# Patient Record
Sex: MALE | Race: BLACK OR AFRICAN AMERICAN | NOT HISPANIC OR LATINO | URBAN - METROPOLITAN AREA
[De-identification: names, ages, dates, MRNs, and addresses within clinical notes are randomized per-mention and may not be internally consistent; named-entity substitution may affect disease eponyms.]

---

## 2019-04-29 ENCOUNTER — EMERGENCY (EMERGENCY)
Facility: HOSPITAL | Age: 34
LOS: 1 days | Discharge: HOME | End: 2019-04-29
Attending: EMERGENCY MEDICINE
Payer: SELF-PAY

## 2019-04-29 VITALS
HEIGHT: 71 IN | HEART RATE: 83 BPM | WEIGHT: 184.97 LBS | SYSTOLIC BLOOD PRESSURE: 131 MMHG | DIASTOLIC BLOOD PRESSURE: 80 MMHG | TEMPERATURE: 98 F | OXYGEN SATURATION: 96 % | RESPIRATION RATE: 16 BRPM

## 2019-04-29 PROCEDURE — 99283 EMERGENCY DEPT VISIT LOW MDM: CPT

## 2019-04-29 NOTE — ED PROVIDER NOTE - PROGRESS NOTE DETAILS
Attending MD Reno: Dr. Chris paged (on for hand) Topher Schmitt DO: received from Dr Reno pending Hand eval. pt repaired/splinted by Hand will f/u in their office. Will dc with shortly course of abx and pain meds prn. Return precautions were discussed with patient at bedside and patient expressed understanding. pt ambulating and stable for dc
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
## 2019-04-29 NOTE — ED PROVIDER NOTE - CARE PLAN
Principal Discharge DX:	Hand injury Principal Discharge DX:	Hand laceration involving tendon, initial encounter

## 2019-04-29 NOTE — ED PROVIDER NOTE - CARE PROVIDER_API CALL
Krishan Chris)  Plastic Surgery; Surgery  [address] [facility] [address], [address]  [place] [address]  Phone: [phone]  Fax: [phone]  Follow Up Time: 1-3 Days

## 2019-04-29 NOTE — ED PROVIDER NOTE - NSFOLLOWUPINSTRUCTIONS_ED_ALL_ED_FT
1) Please follow-up with Dr. Daly in 1 week. Please call today for an appointment.   2) If you have any worsening of symptoms or any other concerns please return to the ED immediately.  3) Please take the medication you were prescribed today and continue taking your home medications as directed.  4) Keep dressing in place 48 hrs
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
## 2019-04-30 VITALS
SYSTOLIC BLOOD PRESSURE: 136 MMHG | RESPIRATION RATE: 18 BRPM | TEMPERATURE: 98 F | DIASTOLIC BLOOD PRESSURE: 87 MMHG | OXYGEN SATURATION: 94 % | HEART RATE: 94 BPM

## 2019-04-30 PROCEDURE — 99285 EMERGENCY DEPT VISIT HI MDM: CPT | Mod: 25

## 2019-04-30 PROCEDURE — 90715 TDAP VACCINE 7 YRS/> IM: CPT

## 2019-04-30 PROCEDURE — 12045 INTMD RPR N-HF/GENIT12.6-20: CPT

## 2019-04-30 PROCEDURE — 73130 X-RAY EXAM OF HAND: CPT

## 2019-04-30 PROCEDURE — 73130 X-RAY EXAM OF HAND: CPT | Mod: 26,RT

## 2019-04-30 PROCEDURE — 90471 IMMUNIZATION ADMIN: CPT

## 2019-04-30 RX ORDER — OXYCODONE HYDROCHLORIDE 5 MG/1
5 TABLET ORAL ONCE
Qty: 0 | Refills: 0 | Status: DISCONTINUED | OUTPATIENT
Start: 2019-04-30 | End: 2019-04-30

## 2019-04-30 RX ORDER — TETANUS TOXOID, REDUCED DIPHTHERIA TOXOID AND ACELLULAR PERTUSSIS VACCINE, ADSORBED 5; 2.5; 8; 8; 2.5 [IU]/.5ML; [IU]/.5ML; UG/.5ML; UG/.5ML; UG/.5ML
0.5 SUSPENSION INTRAMUSCULAR ONCE
Qty: 0 | Refills: 0 | Status: COMPLETED | OUTPATIENT
Start: 2019-04-30 | End: 2019-04-30

## 2019-04-30 RX ORDER — CEPHALEXIN 500 MG
1 CAPSULE ORAL
Qty: 10 | Refills: 0 | OUTPATIENT
Start: 2019-04-30 | End: 2019-05-04

## 2019-04-30 RX ADMIN — OXYCODONE HYDROCHLORIDE 5 MILLIGRAM(S): 5 TABLET ORAL at 00:40

## 2019-04-30 RX ADMIN — TETANUS TOXOID, REDUCED DIPHTHERIA TOXOID AND ACELLULAR PERTUSSIS VACCINE, ADSORBED 0.5 MILLILITER(S): 5; 2.5; 8; 8; 2.5 SUSPENSION INTRAMUSCULAR at 00:20
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
## 2019-04-30 NOTE — CONSULT NOTE ADULT - SUBJECTIVE AND OBJECTIVE BOX
33 yo RHD M s/p gash to dorsal left hand.  Xray negative.  Tendon exposed.  Plastics asked to evaluate.    PMH/PSH: previous right dorsal hand lac  NKDA  No meds  Soc: RHD, works cutting hair, smoker  Fam: noncontrib    ROS: as per HPI and ow neg    PE  NAD  ALert  MMM  PERRL  RIght dorsal hand wtih 15cm oblique laceration  Exposed tendon with lacerated juncturae, muscle also lacerated  No active bleeding  Full ROM  NVI    Xray: no fractures, dislocations, or ROFB    A/P: 33 yo RHD M with R hand laceration    Repaired as per procedure note  Keep dressing in place 48 hrs  Home on abx x5days  Follow up 1 week for wound check    Michelle Daly MD  Plastic Surgery

## 2019-05-03 ENCOUNTER — EMERGENCY (EMERGENCY)
Facility: HOSPITAL | Age: 34
LOS: 1 days | Discharge: HOME | End: 2019-05-03
Attending: EMERGENCY MEDICINE
Payer: SELF-PAY

## 2019-05-03 VITALS
TEMPERATURE: 98 F | HEART RATE: 77 BPM | DIASTOLIC BLOOD PRESSURE: 72 MMHG | RESPIRATION RATE: 18 BRPM | HEIGHT: 71 IN | SYSTOLIC BLOOD PRESSURE: 132 MMHG | OXYGEN SATURATION: 99 % | WEIGHT: 184.97 LBS

## 2019-05-03 PROCEDURE — 99283 EMERGENCY DEPT VISIT LOW MDM: CPT

## 2019-05-03 RX ORDER — OXYCODONE HYDROCHLORIDE 5 MG/1
5 TABLET ORAL ONCE
Qty: 0 | Refills: 0 | Status: DISCONTINUED | OUTPATIENT
Start: 2019-05-03 | End: 2019-05-03

## 2019-05-03 RX ORDER — IBUPROFEN 200 MG
600 TABLET ORAL ONCE
Qty: 0 | Refills: 0 | Status: COMPLETED | OUTPATIENT
Start: 2019-05-03 | End: 2019-05-03

## 2019-05-03 RX ADMIN — Medication 600 MILLIGRAM(S): at 18:02

## 2019-05-03 RX ADMIN — OXYCODONE HYDROCHLORIDE 5 MILLIGRAM(S): 5 TABLET ORAL at 19:12
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
## 2019-05-03 NOTE — ED ADULT NURSE REASSESSMENT NOTE - NS ED NURSE REASSESS COMMENT FT1
patient is resting in bed in no acute distress. patient states pain is much better, 4/10, NP at the bedside to clean and wrap right hand. to be d.c

## 2019-05-03 NOTE — ED PROVIDER NOTE - PHYSICAL EXAMINATION
NAD, VSS, Afebrile, Lungs clear, + right hand dorsum; multiple stitches with large size hematoma and tender. N/V- intact.

## 2019-05-03 NOTE — ED PROVIDER NOTE - PROGRESS NOTE DETAILS
Spoked to Dr. Puetne and recommended warm compression, elevation, avoid NSAIDs and f/u with her on Tuesday. PT well understood and will f/u with the plastic surgeon on Tuesday. Spoked to Dr. Daly and recommended warm compression, elevation, avoid NSAIDs and f/u with her on Tuesday. PT well understood and will f/u with the plastic surgeon on Tuesday.

## 2019-05-03 NOTE — ED PROVIDER NOTE - ATTENDING CONTRIBUTION TO CARE
Attending MD Reno: I personally have seen and examined this patient.  NP note reviewed and agree on plan of care and except where noted.  See below for details.     Seen in FT5L, accompanied by girlfriend and step daughter    34M with no reported PMH/PSH/Meds/Allergies presents to the ED s/p laceration to the R hand on 4/29/19 repaired by Dr. Daly of Hand/Plastics after being consulted by this MD to the ED.  Tetanus updated that day.  Reports today at 4pm developed sudden onset of pain and swelling.  Reports he did not call Dr. Daly's office because he did not have the doctor's card.  Denies fevers, chills.  Denies new trauma to hand.  Reports does not want to move fingers secondary to pain.  On exam, NAD, unlabored breathing, +tenderness to palpation at area of laceration but no erythema or warmth, small amount of blood at distal aspect of laceration, cap refill <2s, +2 radials; A/P: 34M with R hand lac since 4/29/19 repaired by hand/plastics, will call plastics

## 2019-05-03 NOTE — ED PROVIDER NOTE - CARE PROVIDER_API CALL
Krishan Chris)  Plastic Surgery; Surgery  [address] [facility] [address] [facility] [address], [address]  [place] [address]  Phone: [phone]  Fax: [phone]  Follow Up Time:

## 2019-05-03 NOTE — ED ADULT NURSE NOTE - OBJECTIVE STATEMENT
33 yo male presents to the ED from home c/o right hand pain today. patient states he had surgery on right hand tendon 4 days ago. patient states he took off bandage today and noticed the swelling and bleeding from the incision site 2 hours ago. patient is AAOx3. lungs sounds clear. cap refill <3sec. patient anterior portion of hand is swollen, scant sanguinous drainage noted from stiches. no pus noted, no warmth or redness noted. swelling noted to right 5 fingers bilaterally. radial pulses noted. patient denies fevers, chills, N/V/D, HA, dizziness, cough, abdominal pain, chest pain SOB, numbness or tingling. VSS. will continue to monitor.

## 2019-05-03 NOTE — ED PROVIDER NOTE - NSFOLLOWUPINSTRUCTIONS_ED_ALL_ED_FT
Keep wound clean and dry.  Elevate the affected hand.  Warm compression as directed by the plastic surgeon Dr. Puente.  No NSAIDs (Advil, Motrin, Ibuprofen or Aleve) and take Tylenol for pain as needed.  Follow up with the plastic surgeon Dr. Puente on Tuesday.   Return for any concerns or worsening symptoms. Keep wound clean and dry.  Elevate the affected hand.  Warm compression as directed by the plastic surgeon Dr. Daly.  No NSAIDs (Advil, Motrin, Ibuprofen or Aleve) and take Tylenol for pain as needed.  Follow up with the plastic surgeon Dr. Daly on Tuesday.   Return for any concerns or worsening symptoms.

## 2023-06-02 NOTE — PROCEDURE NOTE - NSLAC1DETAILSPROC_SKIN_A_CORE
Addended by: HUMZA CARRASCO on: 6/2/2023 02:56 PM     Modules accepted: Orders     wound explored to base in bloodless field